# Patient Record
Sex: MALE | Race: AMERICAN INDIAN OR ALASKA NATIVE
[De-identification: names, ages, dates, MRNs, and addresses within clinical notes are randomized per-mention and may not be internally consistent; named-entity substitution may affect disease eponyms.]

---

## 2020-04-25 ENCOUNTER — HOSPITAL ENCOUNTER (EMERGENCY)
Dept: HOSPITAL 5 - ED | Age: 30
LOS: 1 days | Discharge: HOME | End: 2020-04-26
Payer: SELF-PAY

## 2020-04-25 DIAGNOSIS — R45.851: Primary | ICD-10-CM

## 2020-04-25 DIAGNOSIS — M54.2: ICD-10-CM

## 2020-04-25 DIAGNOSIS — Z72.89: ICD-10-CM

## 2020-04-25 DIAGNOSIS — R10.31: ICD-10-CM

## 2020-04-25 PROCEDURE — 81001 URINALYSIS AUTO W/SCOPE: CPT

## 2020-04-25 PROCEDURE — 80053 COMPREHEN METABOLIC PANEL: CPT

## 2020-04-25 PROCEDURE — 93005 ELECTROCARDIOGRAM TRACING: CPT

## 2020-04-25 PROCEDURE — 36415 COLL VENOUS BLD VENIPUNCTURE: CPT

## 2020-04-25 PROCEDURE — 85610 PROTHROMBIN TIME: CPT

## 2020-04-25 PROCEDURE — 71045 X-RAY EXAM CHEST 1 VIEW: CPT

## 2020-04-25 PROCEDURE — 82550 ASSAY OF CK (CPK): CPT

## 2020-04-25 PROCEDURE — 74177 CT ABD & PELVIS W/CONTRAST: CPT

## 2020-04-25 PROCEDURE — 85730 THROMBOPLASTIN TIME PARTIAL: CPT

## 2020-04-25 PROCEDURE — 83735 ASSAY OF MAGNESIUM: CPT

## 2020-04-25 PROCEDURE — 80320 DRUG SCREEN QUANTALCOHOLS: CPT

## 2020-04-25 PROCEDURE — G0480 DRUG TEST DEF 1-7 CLASSES: HCPCS

## 2020-04-25 PROCEDURE — 70491 CT SOFT TISSUE NECK W/DYE: CPT

## 2020-04-25 PROCEDURE — 80307 DRUG TEST PRSMV CHEM ANLYZR: CPT

## 2020-04-25 PROCEDURE — 99285 EMERGENCY DEPT VISIT HI MDM: CPT

## 2020-04-25 PROCEDURE — 85025 COMPLETE CBC W/AUTO DIFF WBC: CPT

## 2020-04-25 PROCEDURE — 83690 ASSAY OF LIPASE: CPT

## 2020-04-25 NOTE — EMERGENCY DEPARTMENT REPORT
<JENNIFERMARCOS C - Last Filed: 04/26/20 04:55>





ED General Adult HPI





- General


Chief complaint: Psych


Stated complaint: SUICIDAL


Time Seen by Provider: 04/25/20 23:24





- Related Data


                                    Allergies











Allergy/AdvReac Type Severity Reaction Status Date / Time


 


No Known Allergies Allergy   Unverified 04/25/20 23:29














ED Medical Decision Making





- Lab Data


Result diagrams: 


                                 04/25/20 23:45





                                 04/25/20 23:45





- Radiology Data


Radiology results: report reviewed





NECK 4/26/2020 HISTORY: neck pain injected caustic material into the right, 

100CC [id number] FINDINGS: Contrast enhanced CT images of the soft tissues of the 

neck were obtained. Images are evaluated in the axial, coronal, and sagittal 

plane. There is no evidence of abnormal neck mass, fluid collection, or 

inflammation. Scattered bilateral cervical lymph nodes are noted, slightly more 

numerous on the right. These have maximum size of approximately 9-10 mm. 

IMPRESSION: No significant abnormality. 





CT ABDOMEN AND PELVIS WITH CONTRAST INDICATION: abd pain rlq pain CONTRAST: 100 

cc Omnipaque 300 IV COMPARISON: None available. All CT scans at this location 

are performed using CT dose reduction for ALARA by means of automated exposure 

control. FINDINGS: Lung bases are clear of infiltrates. No pneumoperitoneum is 

seen. No significant abdominal wall herniation is noted. No evidence of bowel ob

struction is noted. Appendix appears within normal limits. No focal inflammatory

changes are seen. No urinary obstructive changes are noted. I see no 

abnormalities of the liver, gallbladder, bile ducts, pancreas, spleen, kidneys, 

or adrenals. No lymphadenopathy is seen. No free fluid is noted. IMPRESSION: No 

acute abnormalities are seen 





- Medical Decision Making





ct studies negative 


ed workup unremarkable


vitals stable


pt is medically cleared for psych admission





ED Disposition


Clinical Impression: 


 Suicidal behavior, Medical clearance for psychiatric admission, Depression





Disposition: DC-01 TO HOME OR SELFCARE


Condition: Stable


Instructions:  Depression (ED), Suicide Prevention for Adults (ED)


Additional Instructions: 


Please follow-up with the [facility], or any of the 

outpatient psychiatric referrals that you have been given by the psychiatric 

team.  Return to the emergency department immediately with any worsening of your

symptoms, thoughts of harming your self or others, or with any acute distress.


Referrals: 


[facility],MD [Primary Care Provider] - 3-5 Days


[facility] [Outside] - 3-5 Days





<SHANNON MCNEAL - Last Filed: 04/26/20 09:47>





ED Medical Decision Making





- Lab Data


Result diagrams: 


                                 04/25/20 23:45





                                 04/25/20 23:45





- Medical Decision Making


This patient was medically cleared by the previous ER physicians.  Seen by the 

psychiatric team who has recommended discharged home with outpatient follow-up. 

 The patient denies any current suicidal ideations.  Vital signs stable 

throughout his ED course.  He has been instructed to return to the emergency 

department with any worsening of his symptoms, thoughts of harming himself or 

others, or if any acute distress.





ED Disposition


Is pt being admited?: No





<SRINI TOMAS - Last Filed: 04/27/20 05:28>





ED General Adult HPI





- General


Source: patient, EMS ( EMS documentation not available at time of chart 

dictation ), RN notes reviewed


Mode of arrival: Stretcher


Limitations: No Limitations





- History of Present Illness


Initial comments: 





Patient is a 30-year-old gentleman.  He is not known to myself previously.  He 

is brought to the hospital by emergency medical services.





Patient reported that he injected himself with his mother's insulin needle on 

the right side of his neck with pinesol and cleaning agent





He reports that the injection took place at around 9:30 PM on April 25, 2020.  

He also reported injecting his left distal dorsal forearm.





He is reportedly desponded and upset his current relationship with his girlfrie

nd.





There is no complaint of headache, chest pain, testicular pain, he is not 

homicidal, he is remarkable for his actions, and he also reports new onset right

 lower quadrant pain, which developed while he was here in the emergency room.





The right lower quadrant pain is sharp, increases with palpation, decreases with

 rest and position.  There is no complaint of radiation of pain.  He also has 

mild right-sided paracervical neck pain, where he reportedly self injected.








-: Gradual, Sudden


Location: neck, abdomen


Radiation: non-radiation


Quality: aching


Consistency: intermittent


Improves with: rest


Worsens with: movement





ED Review of Systems


ROS: 


Stated complaint: SUICIDAL


Other details as noted in HPI





Constitutional: denies: fever


Eyes: denies: eye discharge


ENT: denies: congestion


Cardiovascular: denies: chest pain, syncope


Genitourinary: denies: testicular pain


Musculoskeletal: as per HPI, myalgia


Skin: as per HPI


Neurological: as per HPI


Psychiatric: anxiety, suicidal thoughts.  denies: homicidal thoughts


Hematological/Lymphatic: as per HPI





ED Past Medical Hx





- Past Medical History


Previous Medical History?: No





- Surgical History


Past Surgical History?: No





- Social History


Smoking Status: Current Every Day Smoker


Substance Use Type: None





ED Physical Exam





- General


Limitations: No Limitations


General appearance: alert, anxious, in distress





- Head


Head exam: Present: atraumatic, normocephalic





- Eye


Eye exam: Present: normal appearance, EOMI.  Absent: nystagmus





- ENT


ENT exam: Present: normal exam, normal orophraynx, mucous membranes moist, 

normal external ear exam





- Neck


Neck exam: Present: normal inspection, tenderness (There is point tenderness on 

the right anterior cervical neck, without redness, pus or streaking, there are 

no meningeal signs, and there is no obvious injection site), full ROM.  Absent: 

meningismus





- Respiratory


Respiratory exam: Present: normal lung sounds bilaterally.  Absent: respiratory 

distress





- Cardiovascular


Cardiovascular Exam: Present: regular rate, normal rhythm, normal heart sounds. 

 Absent: bradycardia, tachycardia, irregular rhythm, systolic murmur, diastolic 

murmur, rubs, gallop





- GI/Abdominal


GI/Abdominal exam: Present: soft, tenderness, normal bowel sounds, other (There 

is right lower quadrant tenderness without rebound, guarding or peritoneal 

sign).  Absent: distended, guarding, rebound, rigid, pulsatile mass





- Rectal


Rectal exam: Present: deferred





- 


 exam: Present: normal inspection


External exam: Present: normal external exam (Chaperoned by Karen Mae), other

 (There is normal testicular lie.  There is normal cremasteric reflex.  There is

 no testicular tenderness.  There is no testicular swelling)





- Extremities Exam


Extremities exam: Present: normal inspection, full ROM, other (2+ pulses noted 

in the bilateral upper and lower extremities.  There is no palpable cord.   

negative Homans sign.  Muscular compartments are soft.  The pelvis is stable.). 

 Absent: pedal edema, calf tenderness





- Back Exam


Back exam: Present: normal inspection, full ROM.  Absent: tenderness, CVA 

tenderness (R), CVA tenderness (L), paraspinal tenderness, vertebral tenderness





- Neurological Exam


Neurological exam: Present: alert, oriented X3, normal gait, other (No facial 

droop.  Tongue midline.  Extraocular movements intact bilaterally.  Facial 

sensation intact to light touch in V1, V2, V3 distribution bilaterally.  5 and a

 5 strength in 4 extremities.  Sensation intact to light touch in 4 extremities.

).  Absent: motor sensory deficit





- Psychiatric


Psychiatric exam: Present: anxious, suicidal ideation





- Skin


Skin exam: Present: warm, dry, intact, normal color.  Absent: rash





ED Course


                                   Vital Signs











  04/25/20 04/25/20 04/25/20





  23:21 23:28 23:30


 


Temperature 98.1 F 98.1 F 


 


Pulse Rate 77  76


 


Respiratory 15 20 12





Rate   


 


Blood Pressure 147/81  148/86


 


O2 Sat by Pulse 100 98 97





Oximetry   














  04/25/20 04/25/20 04/26/20





  23:45 23:46 00:01


 


Temperature   


 


Pulse Rate 70 76 68


 


Respiratory 12 9 L 10 L





Rate   


 


Blood Pressure 148/86 100/65 108/70


 


O2 Sat by Pulse 98 99 100





Oximetry   














  04/26/20 04/26/20 04/26/20





  00:15 00:31 00:45


 


Temperature   


 


Pulse Rate   74


 


Respiratory 29 H 27 H 16





Rate   


 


Blood Pressure 108/70 128/72 128/72


 


O2 Sat by Pulse 100 95 98





Oximetry   














  04/26/20 04/26/20 04/26/20





  01:00 01:17 01:31


 


Temperature   


 


Pulse Rate 69 82 57 L


 


Respiratory 26 H 22 15





Rate   


 


Blood Pressure 112/72 112/72 112/72


 


O2 Sat by Pulse 100 95 96





Oximetry   














  04/26/20 04/26/20 04/26/20





  01:45 02:01 02:15


 


Temperature   


 


Pulse Rate 66  


 


Respiratory 13 22 20





Rate   


 


Blood Pressure 112/72 112/72 112/72


 


O2 Sat by Pulse 96 98 94





Oximetry   














  04/26/20 04/26/20 04/26/20





  02:31 02:45 03:01


 


Temperature   


 


Pulse Rate   


 


Respiratory 17 21 21





Rate   


 


Blood Pressure 112/72 112/72 112/72


 


O2 Sat by Pulse 88 94 96





Oximetry   














  04/26/20 04/26/20 04/26/20





  03:15 03:31 03:45


 


Temperature   


 


Pulse Rate   55 L


 


Respiratory 20 16 24





Rate   


 


Blood Pressure 112/72 112/72 118/60


 


O2 Sat by Pulse 96 94 96





Oximetry   














  04/26/20 04/26/20 04/26/20





  04:01 04:15 04:30


 


Temperature   


 


Pulse Rate 64 64 54 L


 


Respiratory 23 24 20





Rate   


 


Blood Pressure 114/59 114/59 123/62


 


O2 Sat by Pulse 95 95 99





Oximetry   














  04/26/20 04/26/20 04/26/20





  04:45 04:58 05:00


 


Temperature  98.0 F 


 


Pulse Rate 63  62


 


Respiratory 21  21





Rate   


 


Blood Pressure 123/62  104/57


 


O2 Sat by Pulse 98  97





Oximetry   














  04/26/20 04/26/20 04/26/20





  05:15 05:30 05:45


 


Temperature   


 


Pulse Rate 67 57 L 55 L


 


Respiratory 23 10 L 22





Rate   


 


Blood Pressure 104/57 118/65 118/65


 


O2 Sat by Pulse 96 95 98





Oximetry   














  04/26/20 04/26/20 04/26/20





  06:00 06:15 06:31


 


Temperature   


 


Pulse Rate 53 L 55 L 57 L


 


Respiratory 24 23 15





Rate   


 


Blood Pressure 106/66 106/66 117/55


 


O2 Sat by Pulse 100 98 98





Oximetry   














  04/26/20 04/26/20 04/26/20





  06:45 07:01 07:15


 


Temperature   


 


Pulse Rate 67 65 65


 


Respiratory 27 H 19 21





Rate   


 


Blood Pressure 117/55 115/47 115/47


 


O2 Sat by Pulse 94 97 96





Oximetry   














  04/26/20 04/26/20 04/26/20





  07:31 08:00 08:25


 


Temperature  97.5 F L 


 


Pulse Rate 57 L 59 L 50 L


 


Respiratory 20 25 H 17





Rate   


 


Blood Pressure 106/57 113/55 113/55


 


O2 Sat by Pulse 99 100 95





Oximetry   














  04/26/20 04/26/20 04/26/20





  08:31 08:35 08:41


 


Temperature   


 


Pulse Rate 81 75 72


 


Respiratory 20  15





Rate   


 


Blood Pressure   134/84


 


O2 Sat by Pulse 100  96





Oximetry   














  04/26/20 04/26/20





  08:45 08:51


 


Temperature  


 


Pulse Rate 76 72


 


Respiratory 13 19





Rate  


 


Blood Pressure  


 


O2 Sat by Pulse 95 100





Oximetry  














- Reevaluation(s)


Reevaluation #1: 





04/26/20 00:29


Differential diagnosis, including but not limited to: Suicidality, report of 

self injection, medical clearance for psychiatric placement, appendicitis, 

colitis, diverticulitis, renal colic, perforated viscus, caustic injection into 

the neck





Assessment and plan: 30-year-old gentleman with multiple issues.





Issue #1, report of self injection with cleaning materials.  There is no 

evidence of injection on his upper extremities.  I do not see any evidence of 

abscess, cellulitis, myositis, his neck is supple with minimal point tenderness.

  In addition, the patient reportedly had a conversation with nurse Nirali Prado, where he alluded that he may not have actually injected himself.





The [facility] was called, they recommended 4 hours of observation, 

x-ray of the chest, and standard laboratory studies and EKG.  His left upper 

extremity demonstrates no evidence of tendon dysfunction, he is neurovascularly 

intact, he does not require any imaging at this time.  Given inconsistent 

history, will obtain CT scan of the neck to exclude collection of caustic fluid,

 although clinically I think this is unlikely.





A psychiatric consultation will be obtained, and the patient is placed on 

emergency room hold at this time.





Issue #2, acute onset of right lower quadrant pain, started when the patient 

presented to the emergency room.





Check labs, urinalysis, CT scan of the abdomen pelvis, and reassess.


Reevaluation #2: 





04/26/20 01:47


care will be transferred to Dr NASH Esqueda, to follow up ct neck,  abdomen pelvis and

 complete 4 hours of medical observation





If no decompensation or acute pathology noted, which I anticipate, the patient 

would be medically suitable for psychiatric consultation, placement, and 

evaluation.





ED Medical Decision Making





- Lab Data


Result diagrams: 


                                 04/25/20 23:45





                                 04/25/20 23:45








                                   Vital Signs











  04/25/20 04/25/20 04/25/20





  23:21 23:28 23:30


 


Temperature 98.1 F 98.1 F 


 


Pulse Rate 77  76


 


Respiratory 15 20 12





Rate   


 


Blood Pressure 147/81  148/86


 


O2 Sat by Pulse 100 98 97





Oximetry   














  04/25/20





  23:45


 


Temperature 


 


Pulse Rate 70


 


Respiratory 12





Rate 


 


Blood Pressure 148/86


 


O2 Sat by Pulse 98





Oximetry 











                                   Lab Results











  04/25/20 04/25/20 04/25/20 Range/Units





  23:45 23:45 23:45 


 


WBC  11.0    (4.5-11.0)  K/mm3


 


RBC  4.73    (3.65-5.03)  M/mm3


 


Hgb  13.3    (11.8-15.2)  gm/dl


 


Hct  39.7    (35.5-45.6)  %


 


MCV  84    (84-94)  fl


 


MCH  28    (28-32)  pg


 


MCHC  34    (32-34)  %


 


RDW  13.7    (13.2-15.2)  %


 


Plt Count  286    (140-440)  K/mm3


 


Lymph % (Auto)  31.0    (13.4-35.0)  %


 


Mono % (Auto)  6.8    (0.0-7.3)  %


 


Eos % (Auto)  0.8    (0.0-4.3)  %


 


Baso % (Auto)  0.9    (0.0-1.8)  %


 


Lymph #  3.4    (1.2-5.4)  K/mm3


 


Mono #  0.7    (0.0-0.8)  K/mm3


 


Eos #  0.1    (0.0-0.4)  K/mm3


 


Baso #  0.1    (0.0-0.1)  K/mm3


 


Seg Neutrophils %  60.5    (40.0-70.0)  %


 


Seg Neutrophils #  6.7    (1.8-7.7)  K/mm3


 


Sodium     (137-145)  mmol/L


 


Potassium     (3.6-5.0)  mmol/L


 


Chloride     ()  mmol/L


 


Carbon Dioxide     (22-30)  mmol/L


 


Anion Gap     mmol/L


 


BUN     (9-20)  mg/dL


 


Creatinine     (0.8-1.5)  mg/dL


 


Estimated GFR     ml/min


 


BUN/Creatinine Ratio     %


 


Glucose     ()  mg/dL


 


Calcium     (8.4-10.2)  mg/dL


 


Magnesium   2.10   (1.7-2.3)  mg/dL


 


Total Bilirubin     (0.1-1.2)  mg/dL


 


AST     (5-40)  units/L


 


ALT     (7-56)  units/L


 


Alkaline Phosphatase     ()  units/L


 


Total Creatine Kinase   326 H   ()  units/L


 


Total Protein     (6.3-8.2)  g/dL


 


Albumin     (3.9-5)  g/dL


 


Albumin/Globulin Ratio     %


 


Lipase   80 H   (13-60)  units/L


 


Salicylates    < 0.3 L  (2.8-20.0)  mg/dL


 


Acetaminophen     (10.0-30.0)  ug/mL


 


Plasma/Serum Alcohol     (0-0.07)  %














  04/25/20 04/25/20 04/25/20 Range/Units





  23:45 23:45 23:45 


 


WBC     (4.5-11.0)  K/mm3


 


RBC     (3.65-5.03)  M/mm3


 


Hgb     (11.8-15.2)  gm/dl


 


Hct     (35.5-45.6)  %


 


MCV     (84-94)  fl


 


MCH     (28-32)  pg


 


MCHC     (32-34)  %


 


RDW     (13.2-15.2)  %


 


Plt Count     (140-440)  K/mm3


 


Lymph % (Auto)     (13.4-35.0)  %


 


Mono % (Auto)     (0.0-7.3)  %


 


Eos % (Auto)     (0.0-4.3)  %


 


Baso % (Auto)     (0.0-1.8)  %


 


Lymph #     (1.2-5.4)  K/mm3


 


Mono #     (0.0-0.8)  K/mm3


 


Eos #     (0.0-0.4)  K/mm3


 


Baso #     (0.0-0.1)  K/mm3


 


Seg Neutrophils %     (40.0-70.0)  %


 


Seg Neutrophils #     (1.8-7.7)  K/mm3


 


Sodium   141   (137-145)  mmol/L


 


Potassium   3.6   (3.6-5.0)  mmol/L


 


Chloride   105.8   ()  mmol/L


 


Carbon Dioxide   24   (22-30)  mmol/L


 


Anion Gap   15   mmol/L


 


BUN   10   (9-20)  mg/dL


 


Creatinine   0.9   (0.8-1.5)  mg/dL


 


Estimated GFR   > 60   ml/min


 


BUN/Creatinine Ratio   11   %


 


Glucose   80   ()  mg/dL


 


Calcium   8.5   (8.4-10.2)  mg/dL


 


Magnesium     (1.7-2.3)  mg/dL


 


Total Bilirubin   0.20   (0.1-1.2)  mg/dL


 


AST   16   (5-40)  units/L


 


ALT   15   (7-56)  units/L


 


Alkaline Phosphatase   104   ()  units/L


 


Total Creatine Kinase     ()  units/L


 


Total Protein   7.3   (6.3-8.2)  g/dL


 


Albumin   4.3   (3.9-5)  g/dL


 


Albumin/Globulin Ratio   1.4   %


 


Lipase     (13-60)  units/L


 


Salicylates     (2.8-20.0)  mg/dL


 


Acetaminophen  < 5.0 L    (10.0-30.0)  ug/mL


 


Plasma/Serum Alcohol    < 0.01  (0-0.07)  %














- EKG Data


-: EKG Interpreted by Me


EKG shows normal: sinus rhythm, axis, ST-T waves





- EKG Data


When compared to previous EKG there are: previous EKG unavailable





04/26/20 00:29


Sinus rhythm, 67 bpm, normal axis, QTC within normal limits, borderline first-

degree AV block, the EKG is nonspecific, the EKG is not a STEMI, there is no 

prior for comparison











- Radiology Data


Radiology results: pending, image reviewed


interpreted by me: 





X-ray of the chest is negative for acute disease.


Critical care attestation.: 


If time is entered above; I have spent that time in minutes in the direct care 

of this critically ill patient, excluding procedure time.








ED Disposition


Is pt being admited?: No


Does the pt Need Aspirin: No

## 2020-04-26 VITALS — DIASTOLIC BLOOD PRESSURE: 84 MMHG | SYSTOLIC BLOOD PRESSURE: 134 MMHG

## 2020-04-26 LAB
ALBUMIN SERPL-MCNC: 4.3 G/DL (ref 3.9–5)
ALT SERPL-CCNC: 15 UNITS/L (ref 7–56)
APTT BLD: 30.7 SEC. (ref 24.2–36.6)
BASOPHILS # (AUTO): 0.1 K/MM3 (ref 0–0.1)
BASOPHILS NFR BLD AUTO: 0.9 % (ref 0–1.8)
BENZODIAZEPINES SCREEN,URINE: (no result)
BILIRUB UR QL STRIP: (no result)
BLOOD UR QL VISUAL: (no result)
BUN SERPL-MCNC: 10 MG/DL (ref 9–20)
BUN/CREAT SERPL: 11 %
CALCIUM SERPL-MCNC: 8.5 MG/DL (ref 8.4–10.2)
EOSINOPHIL # BLD AUTO: 0.1 K/MM3 (ref 0–0.4)
EOSINOPHIL NFR BLD AUTO: 0.8 % (ref 0–4.3)
HCT VFR BLD CALC: 39.7 % (ref 35.5–45.6)
HEMOLYSIS INDEX: 9
HGB BLD-MCNC: 13.3 GM/DL (ref 11.8–15.2)
INR PPP: 1.01 (ref 0.87–1.13)
LYMPHOCYTES # BLD AUTO: 3.4 K/MM3 (ref 1.2–5.4)
LYMPHOCYTES NFR BLD AUTO: 31 % (ref 13.4–35)
MCHC RBC AUTO-ENTMCNC: 34 % (ref 32–34)
MCV RBC AUTO: 84 FL (ref 84–94)
METHADONE SCREEN,URINE: (no result)
MONOCYTES # (AUTO): 0.7 K/MM3 (ref 0–0.8)
MONOCYTES % (AUTO): 6.8 % (ref 0–7.3)
MUCOUS THREADS #/AREA URNS HPF: (no result) /HPF
OPIATE SCREEN,URINE: (no result)
PH UR STRIP: 5 [PH] (ref 5–7)
PLATELET # BLD: 286 K/MM3 (ref 140–440)
PROT UR STRIP-MCNC: (no result) MG/DL
RBC # BLD AUTO: 4.73 M/MM3 (ref 3.65–5.03)
RBC #/AREA URNS HPF: 6 /HPF (ref 0–6)
UROBILINOGEN UR-MCNC: 2 MG/DL (ref ?–2)
WBC #/AREA URNS HPF: 1 /HPF (ref 0–6)

## 2020-04-26 NOTE — CAT SCAN REPORT
CT ABDOMEN AND PELVIS WITH CONTRAST



INDICATION: abd pain rlq pain



CONTRAST: 100 cc Omnipaque 300 IV



COMPARISON: None available.



All CT scans at this location are performed using CT dose reduction for ALARA by means of automated e
xposure control. 



FINDINGS: Lung bases are clear of infiltrates. No pneumoperitoneum is seen. No significant abdominal 
wall herniation is noted. No evidence of bowel obstruction is noted. Appendix appears within normal l
imits. No focal inflammatory changes are seen. No urinary obstructive changes are noted. I see no abn
ormalities of the liver, gallbladder, bile ducts, pancreas, spleen, kidneys, or adrenals. No lymphade
nopathy is seen. No free fluid is noted.





IMPRESSION: No acute abnormalities are seen





Signer Name: Anish Ortiz MD 

Signed: 4/26/2020 2:09 AM

Workstation Name: "[id number]

## 2020-04-26 NOTE — CAT SCAN REPORT
NECK 4/26/2020



HISTORY: neck pain injected caustic material into the right, 100CC [id number]



FINDINGS: Contrast enhanced CT images of the soft tissues of the neck were obtained. Images are evalu
ated in the axial, coronal, and sagittal plane.



There is no evidence of abnormal neck mass, fluid collection, or inflammation.



Scattered bilateral cervical lymph nodes are noted, slightly more numerous on the right. These have m
aximum size of approximately 9-10 mm.







IMPRESSION: No significant abnormality.













All CT scans at this location are performed using dose reduction to ALARA by means of automated expos
ure control.



Signer Name: Vasquez Gallardo MD 

Signed: 4/26/2020 2:12 AM

Workstation Name: [id number]

## 2020-04-26 NOTE — XRAY REPORT
CHEST 1 VIEW 0034



INDICATION / CLINICAL INFORMATION: hx of od and injection into skin of right neck.



COMPARISON: None available.



FINDINGS:



SUPPORT DEVICES: None



HEART / MEDIASTINUM: No significant abnormality. 



LUNGS / PLEURA: No significant pulmonary or pleural abnormality. No pneumothorax. 



ADDITIONAL FINDINGS: No significant additional findings.



IMPRESSION: No significant acute abnormality



Signer Name: Anish Ortiz MD 

Signed: 4/26/2020 1:11 AM

Workstation Name: [id number]